# Patient Record
Sex: MALE | URBAN - METROPOLITAN AREA
[De-identification: names, ages, dates, MRNs, and addresses within clinical notes are randomized per-mention and may not be internally consistent; named-entity substitution may affect disease eponyms.]

---

## 2019-08-16 ENCOUNTER — TELEPHONE (OUTPATIENT)
Dept: GASTROENTEROLOGY | Facility: CLINIC | Age: 39
End: 2019-08-16

## 2019-08-16 NOTE — TELEPHONE ENCOUNTER
Ma spoke to pt mother informed her we will need pt GI records, provided pt mother with our office fax number and phone number. As soon as our office receives records we can schedule apt.    Pt mother verbalize understanding and thank Ma     ----- Message from Krystyna Davies sent at 8/16/2019  9:31 AM CDT -----  Regarding: Crohns disease referral  Good morning,    Patient being referred for Crohns disease. Patient is being referred by Dr. Hopson, General Surgeon in [place], [place]. After speaking with Mom, they aren't comfortable with obtaining referral from GI provider in [place] so it was recommended for patient to be seen by General GI provider here and then refer to Dr. Barth if needed. Referral and records scanned into  for review. Please advise if patient can be scheduled in your clinic.      Thanks!  Krystyna